# Patient Record
Sex: MALE | Race: WHITE
[De-identification: names, ages, dates, MRNs, and addresses within clinical notes are randomized per-mention and may not be internally consistent; named-entity substitution may affect disease eponyms.]

---

## 2018-01-02 ENCOUNTER — HOSPITAL ENCOUNTER (EMERGENCY)
Dept: HOSPITAL 76 - ED | Age: 33
Discharge: HOME | End: 2018-01-02
Payer: COMMERCIAL

## 2018-01-02 VITALS — DIASTOLIC BLOOD PRESSURE: 77 MMHG | SYSTOLIC BLOOD PRESSURE: 143 MMHG

## 2018-01-02 DIAGNOSIS — S61.214A: Primary | ICD-10-CM
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
## 2018-01-02 PROCEDURE — 99282 EMERGENCY DEPT VISIT SF MDM: CPT

## 2018-01-02 PROCEDURE — 99283 EMERGENCY DEPT VISIT LOW MDM: CPT

## 2018-01-02 PROCEDURE — 12001 RPR S/N/AX/GEN/TRNK 2.5CM/<: CPT

## 2018-07-16 ENCOUNTER — HOSPITAL ENCOUNTER (OUTPATIENT)
Age: 33
End: 2018-07-16
Payer: COMMERCIAL

## 2018-07-16 DIAGNOSIS — M25.561: Primary | ICD-10-CM

## 2018-07-16 DIAGNOSIS — M25.562: ICD-10-CM

## 2018-07-16 DIAGNOSIS — M25.461: ICD-10-CM

## 2018-07-16 DIAGNOSIS — M25.572: ICD-10-CM

## 2018-07-16 DIAGNOSIS — G89.29: ICD-10-CM

## 2018-07-16 DIAGNOSIS — M25.571: ICD-10-CM

## 2018-07-16 DIAGNOSIS — M25.462: ICD-10-CM

## 2018-07-16 PROCEDURE — 73610 X-RAY EXAM OF ANKLE: CPT

## 2018-07-16 PROCEDURE — 73562 X-RAY EXAM OF KNEE 3: CPT

## 2018-07-16 NOTE — DI.RAD.S_ITS
PROCEDURE:  XR KNEE LT 3V  
   
INDICATIONS:  Pain in bilateral knees and ankles  
   
TECHNIQUE: 3 views of the knee were acquired.    
   
COMPARISON:  [facility], CR, XR KNEE ARTHRITIC SERIES RT, 8/10/2017, 9:11.  
   
FINDINGS:    
   
Bones:  No fractures or dislocations.  No suspicious bony lesions.  Mild joint space   
narrowing in the lateral femorotibial compartment.  
   
Soft tissues: Small joint effusion.  No suspicious soft tissue calcifications.    
   
IMPRESSION:   
   
1. No fracture or dislocation.  
   
2. Mild joint space narrowing in the lateral femorotibial compartment.  
   
3. Small knee joint effusion.  
   
   
   
Dictated by: Radha Riley M.D. on 7/17/2018 at 16:57       
Approved by: Radha Riley M.D. on 7/17/2018 at 16:58

## 2018-07-16 NOTE — DI.RAD.S_ITS
PROCEDURE:  XR KNEE RT 3V  
   
INDICATIONS:  Pain in bilateral knees and ankles  
   
TECHNIQUE: 3 views of the knee were acquired.    
   
COMPARISON:  [facility], CR, XR KNEE ARTHRITIC SERIES RT, 8/10/2017, 9:11.    
[facility], MR, MR KNEE RT W CON, 8/31/2017, 16:02.  
   
FINDINGS:    
   
Bones:  No fractures or dislocations.  No suspicious bony lesions.    
   
Soft tissues: Small joint effusion.  No suspicious soft tissue calcifications.    
   
IMPRESSION:  
   
1. No fracture or dislocation.  
   
2. Small knee joint effusion.  
   
   
   
Dictated by: Radha Riley M.D. on 7/17/2018 at 17:00       
Approved by: Radah Riley M.D. on 7/17/2018 at 17:01

## 2018-07-16 NOTE — DI.RAD.S_ITS
PROCEDURE:  XR ANKLE RT MIN 3V  
   
INDICATIONS:  Pain in bilateral knees and ankles  
   
TECHNIQUE: 3 views of the ankle were acquired.    
   
COMPARISON:  None.  
   
FINDINGS:    
   
Bones:  No fractures or dislocations.  Ankle mortise is normally aligned.  No suspicious   
bony lesions.    
   
Soft tissues:  No tibiotalar joint effusion.  Achilles tendon appears normal.    
   
IMPRESSION: No fracture or dislocation.  
   
   
   
Dictated by: Radha Riley M.D. on 7/17/2018 at 16:58       
Approved by: Radha Riley M.D. on 7/17/2018 at 17:00

## 2018-07-16 NOTE — DI.RAD.S_ITS
PROCEDURE:  XR ANKLE LT MIN 3V  
   
INDICATIONS:  Pain in bilateral knees and ankles  
   
TECHNIQUE:  3 views of the ankle were acquired.    
   
COMPARISON:  None.  
   
FINDINGS:    
   
Bones:  No fractures or dislocations.  Ankle mortise is normally aligned.  No suspicious   
bony lesions.    
   
Soft tissues:  No tibiotalar joint effusion.  Achilles tendon appears normal.    
   
IMPRESSION: No fracture or dislocation.  
   
   
Dictated by: Radha Riley M.D. on 7/17/2018 at 16:56       
Approved by: Radha Riley M.D. on 7/17/2018 at 16:57

## 2020-02-03 ENCOUNTER — HOSPITAL ENCOUNTER (EMERGENCY)
Dept: HOSPITAL 76 - ED | Age: 35
Discharge: HOME | End: 2020-02-03
Payer: OTHER GOVERNMENT

## 2020-02-03 VITALS — DIASTOLIC BLOOD PRESSURE: 84 MMHG | SYSTOLIC BLOOD PRESSURE: 142 MMHG

## 2020-02-03 DIAGNOSIS — J06.9: Primary | ICD-10-CM

## 2020-02-03 DIAGNOSIS — R03.0: ICD-10-CM

## 2020-02-03 PROCEDURE — 99284 EMERGENCY DEPT VISIT MOD MDM: CPT

## 2020-02-03 PROCEDURE — 87070 CULTURE OTHR SPECIMN AEROBIC: CPT

## 2020-02-03 PROCEDURE — 87430 STREP A AG IA: CPT

## 2020-02-03 PROCEDURE — 71046 X-RAY EXAM CHEST 2 VIEWS: CPT

## 2020-09-22 ENCOUNTER — HOSPITAL ENCOUNTER (OUTPATIENT)
Age: 35
End: 2020-09-22
Payer: COMMERCIAL

## 2020-09-22 DIAGNOSIS — R10.32: Primary | ICD-10-CM

## 2020-09-22 PROCEDURE — 76705 ECHO EXAM OF ABDOMEN: CPT

## 2020-09-22 NOTE — DI.US.S_ITS
PROCEDURE:  US ABDOMEN LIMITED  
   
INDICATIONS:  LEFT LOWER QUADRANT PAIN - RULE OUT HERNIA  
   
TECHNIQUE:    
Real-time focused scanning was performed of the left lower quadrant/inguinal region, with   
image documentation.    
   
COMPARISON:  None.  
   
FINDINGS:  No evidence of abdominal wall hernia in the area of pain.  There is a   
subcentimeter intramuscular focus of hypoechogenicity, possibly cystic within the deep   
aspect of the left rectus muscle.  No increased intramuscular or surrounding vascular   
flow.  
   
IMPRESSION:    
1. No evidence of abdominal wall hernia.  
2. Tiny avascular intramuscular cyst of uncertain clinical significance or etiology.    
This may be incidental versus evidence of a minimal remote muscular injury.    
   
   
Dictated by: Chika Maya M.D. on 9/22/2020 at 8:55       
Approved by: Chika Maya M.D. on 9/22/2020 at 8:59

## 2020-10-13 ENCOUNTER — HOSPITAL ENCOUNTER (OUTPATIENT)
Dept: HOSPITAL 76 - COV | Age: 35
Discharge: HOME | End: 2020-10-13
Attending: FAMILY MEDICINE
Payer: OTHER GOVERNMENT

## 2020-10-13 DIAGNOSIS — Z20.828: ICD-10-CM

## 2020-10-13 DIAGNOSIS — R05: Primary | ICD-10-CM

## 2020-10-13 DIAGNOSIS — J02.9: ICD-10-CM

## 2022-02-15 ENCOUNTER — HOSPITAL ENCOUNTER (EMERGENCY)
Age: 37
LOS: 1 days | Discharge: HOME | End: 2022-02-16
Payer: OTHER GOVERNMENT

## 2022-02-15 VITALS
HEART RATE: 57 BPM | RESPIRATION RATE: 20 BRPM | OXYGEN SATURATION: 99 % | DIASTOLIC BLOOD PRESSURE: 73 MMHG | TEMPERATURE: 97.88 F | SYSTOLIC BLOOD PRESSURE: 150 MMHG

## 2022-02-15 DIAGNOSIS — R10.13: Primary | ICD-10-CM

## 2022-02-15 DIAGNOSIS — Z87.891: ICD-10-CM

## 2022-02-15 DIAGNOSIS — R59.0: ICD-10-CM

## 2022-02-15 LAB
ADD MANUAL DIFF / SLIDE REVIEW: NO
ALBUMIN SERPL-MCNC: 4.6 G/DL (ref 3.5–5)
ALBUMIN/GLOB SERPL: 1.4 {RATIO} (ref 1–2.8)
ALP SERPL-CCNC: 121 U/L (ref 38–126)
ALT SERPL-CCNC: 153 IU/L (ref ?–50)
BUN SERPL-MCNC: 14 MG/DL (ref 9–20)
CALCIUM SERPL-MCNC: 9.9 MG/DL (ref 8.4–10.2)
CHLORIDE SERPL-SCNC: 102 MMOL/L (ref 98–107)
CO2 SERPL-SCNC: 31 MMOL/L (ref 22–32)
ESTIMATED GLOMERULAR FILT RATE: > 60 ML/MIN (ref 60–?)
ETHANOL SERPL-MCNC: < 10 MG/DL
GLOBULIN SER CALC-MCNC: 3.3 G/DL (ref 1.7–4.1)
GLUCOSE SERPL-MCNC: 96 MG/DL (ref 70–100)
HEMATOCRIT: 44 % (ref 41–53)
HEMOGLOBIN: 15.3 G/DL (ref 13.5–17.5)
HEMOLYSIS: 46 (ref 0–50)
LIPASE SERPL-CCNC: 114 U/L (ref 23–300)
LYMPHOCYTES # SPEC AUTO: 2000 /UL (ref 1100–4500)
MCV RBC: 92.8 FL (ref 80–100)
MEAN CORPUSCULAR HEMOGLOBIN: 32.3 PG (ref 26–34)
MEAN CORPUSCULAR HGB CONC: 34.8 % (ref 30–36)
PLATELET COUNT: 207 X10^3/UL (ref 150–400)
POTASSIUM SERPL-SCNC: 4.2 MMOL/L (ref 3.4–5.1)
PROT SERPL-MCNC: 7.9 G/DL (ref 6.3–8.2)
SODIUM SERPL-SCNC: 139 MMOL/L (ref 137–145)

## 2022-02-15 PROCEDURE — 99283 EMERGENCY DEPT VISIT LOW MDM: CPT

## 2022-02-15 PROCEDURE — 99284 EMERGENCY DEPT VISIT MOD MDM: CPT

## 2022-02-15 PROCEDURE — C9113 INJ PANTOPRAZOLE SODIUM, VIA: HCPCS

## 2022-02-15 PROCEDURE — 96374 THER/PROPH/DIAG INJ IV PUSH: CPT

## 2022-02-15 PROCEDURE — 74177 CT ABD & PELVIS W/CONTRAST: CPT

## 2022-02-15 PROCEDURE — 85025 COMPLETE CBC W/AUTO DIFF WBC: CPT

## 2022-02-15 PROCEDURE — 96375 TX/PRO/DX INJ NEW DRUG ADDON: CPT

## 2022-02-15 PROCEDURE — 83690 ASSAY OF LIPASE: CPT

## 2022-02-15 PROCEDURE — 80053 COMPREHEN METABOLIC PANEL: CPT

## 2022-02-15 PROCEDURE — 76705 ECHO EXAM OF ABDOMEN: CPT

## 2022-02-15 PROCEDURE — 80320 DRUG SCREEN QUANTALCOHOLS: CPT

## 2022-02-16 VITALS
OXYGEN SATURATION: 99 % | DIASTOLIC BLOOD PRESSURE: 82 MMHG | HEART RATE: 73 BPM | RESPIRATION RATE: 16 BRPM | SYSTOLIC BLOOD PRESSURE: 155 MMHG

## 2024-02-12 ENCOUNTER — HOSPITAL ENCOUNTER (OUTPATIENT)
Dept: HOSPITAL 76 - DI.N | Age: 39
Discharge: HOME | End: 2024-02-12
Attending: PHYSICIAN ASSISTANT
Payer: OTHER GOVERNMENT

## 2024-02-12 DIAGNOSIS — M79.672: Primary | ICD-10-CM

## 2024-08-07 ENCOUNTER — HOSPITAL ENCOUNTER (OUTPATIENT)
Dept: HOSPITAL 76 - LAB.N | Age: 39
Discharge: HOME | End: 2024-08-07
Attending: FAMILY MEDICINE
Payer: OTHER GOVERNMENT

## 2024-08-07 DIAGNOSIS — M25.50: ICD-10-CM

## 2024-08-07 DIAGNOSIS — R74.8: Primary | ICD-10-CM

## 2024-08-07 LAB
ALBUMIN DIAFP-MCNC: 4.6 G/DL (ref 3.2–5.5)
ALP SERPL-CCNC: 80 IU/L (ref 42–121)
ALT SERPL W P-5'-P-CCNC: 117 IU/L (ref 10–60)
AST SERPL W P-5'-P-CCNC: 64 IU/L (ref 10–42)
BILIRUB BLD-MCNC: 1.2 MG/DL (ref 0.2–1)
BILIRUB DIRECT SERPL-MCNC: 0.17 MG/DL (ref 0.03–0.18)
CRP SERPL-MCNC: < 0.5 MG/DL (ref ?–0.5)
FERRITIN SERPL-SCNC: 2227.3 NG/ML (ref 23.9–336.2)
GLOBULIN SER-MCNC: 2.4 G/DL (ref 2.1–4.2)
PROT SPEC-MCNC: 7 G/DL (ref 6.4–8.9)
RHEUMATOID FACT SER QL: NEGATIVE

## 2024-08-07 PROCEDURE — 86140 C-REACTIVE PROTEIN: CPT

## 2024-08-07 PROCEDURE — 80076 HEPATIC FUNCTION PANEL: CPT

## 2024-08-07 PROCEDURE — 86200 CCP ANTIBODY: CPT

## 2024-08-07 PROCEDURE — 86709 HEPATITIS A IGM ANTIBODY: CPT

## 2024-08-07 PROCEDURE — 82728 ASSAY OF FERRITIN: CPT

## 2024-08-07 PROCEDURE — 86803 HEPATITIS C AB TEST: CPT

## 2024-08-07 PROCEDURE — 86038 ANTINUCLEAR ANTIBODIES: CPT

## 2024-08-07 PROCEDURE — 86430 RHEUMATOID FACTOR TEST QUAL: CPT

## 2024-08-07 PROCEDURE — 36415 COLL VENOUS BLD VENIPUNCTURE: CPT

## 2024-08-07 PROCEDURE — 87340 HEPATITIS B SURFACE AG IA: CPT

## 2024-08-08 ENCOUNTER — HOSPITAL ENCOUNTER (OUTPATIENT)
Dept: HOSPITAL 76 - LAB.N | Age: 39
Discharge: HOME | End: 2024-08-08
Attending: FAMILY MEDICINE
Payer: OTHER GOVERNMENT

## 2024-08-08 DIAGNOSIS — M25.50: Primary | ICD-10-CM

## 2024-08-08 PROCEDURE — 36415 COLL VENOUS BLD VENIPUNCTURE: CPT

## 2024-08-08 PROCEDURE — 85651 RBC SED RATE NONAUTOMATED: CPT

## 2025-04-12 ENCOUNTER — HOSPITAL ENCOUNTER (EMERGENCY)
Age: 40
Discharge: HOME | End: 2025-04-12
Payer: OTHER GOVERNMENT

## 2025-04-12 VITALS — BODY MASS INDEX: 30.9 KG/M2

## 2025-04-12 VITALS
HEART RATE: 66 BPM | DIASTOLIC BLOOD PRESSURE: 84 MMHG | TEMPERATURE: 97.3 F | OXYGEN SATURATION: 96 % | RESPIRATION RATE: 12 BRPM | SYSTOLIC BLOOD PRESSURE: 149 MMHG

## 2025-04-12 VITALS — OXYGEN SATURATION: 94 % | HEART RATE: 61 BPM

## 2025-04-12 VITALS — HEART RATE: 51 BPM | OXYGEN SATURATION: 95 % | RESPIRATION RATE: 18 BRPM

## 2025-04-12 VITALS — HEART RATE: 61 BPM | OXYGEN SATURATION: 96 %

## 2025-04-12 VITALS — OXYGEN SATURATION: 94 % | HEART RATE: 56 BPM

## 2025-04-12 VITALS — HEART RATE: 53 BPM | OXYGEN SATURATION: 95 %

## 2025-04-12 DIAGNOSIS — E83.110: Primary | ICD-10-CM

## 2025-04-12 PROCEDURE — 99281 EMR DPT VST MAYX REQ PHY/QHP: CPT

## 2025-07-21 ENCOUNTER — HOSPITAL ENCOUNTER (OUTPATIENT)
Age: 40
End: 2025-07-21
Payer: COMMERCIAL

## 2025-07-21 DIAGNOSIS — K76.9: ICD-10-CM

## 2025-07-21 DIAGNOSIS — E83.119: Primary | ICD-10-CM

## 2025-07-21 LAB
ADD MANUAL DIFF / SLIDE REVIEW: NO
ALBUMIN SERPL-MCNC: 4.5 G/DL (ref 3.5–5)
ALBUMIN/GLOB SERPL: 1.7 {RATIO} (ref 1–2.8)
ALP SERPL-CCNC: 84 U/L (ref 38–126)
ALT SERPL-CCNC: 57 IU/L (ref ?–50)
AMORPH SED URNS QL MICRO: (no result)
APPEARANCE UR: CLEAR
AST SERPL-CCNC: 44 IU/L (ref 17–59)
BACTERIA URINE: (no result)
BASOPHILS # BLD AUTO: 0 /UL (ref 0–100)
BASOPHILS NFR BLD AUTO: 0.7 % (ref 0–2)
BILIRUB SERPL-MCNC: 0.8 MG/DL (ref 0.2–1.3)
BILIRUBIN URINE UA: NEGATIVE
BUN SERPL-MCNC: 16 MG/DL (ref 9–20)
BUN/CREAT SERPL: 19.5 (ref 6–22)
CALCIUM SERPL-MCNC: 8.9 MG/DL (ref 8.4–10.2)
CASTS URNS QL MICRO: (no result)
CHLORIDE SERPL-SCNC: 105 MMOL/L (ref 98–107)
CO2 SERPL-SCNC: 26 MMOL/L (ref 22–32)
COLOR UR: YELLOW
CREAT SERPL-MCNC: 0.82 MG/DL (ref 0.66–1.25)
CRYSTALS URNS MICRO: (no result)
EOSINOPHIL # BLD AUTO: 100 /UL (ref 0–450)
EOSINOPHIL NFR BLD AUTO: 2.6 % (ref 2–4)
EPI CELLS URNS QL MICRO: (no result)
ESTIMATED GLOMERULAR FILT RATE: > 60 ML/MIN (ref 60–?)
FERRITIN SERPL-MCNC: 688 NG/ML (ref 18–464)
GLOBULIN SER CALC-MCNC: 2.6 G/DL (ref 1.7–4.1)
GLUCOSE SERPL-MCNC: 98 MG/DL (ref 70–99)
GLUCOSE URINE UA: NEGATIVE G/DL
HEMATOCRIT: 40.3 % (ref 41–53)
HEMOGLOBIN: 13.9 G/DL (ref 13.5–17.5)
HEMOLYSIS: < 15 (ref 0–50)
HGB UR QL: NEGATIVE
IRON SERPL-MCNC: 265 UG/DL (ref 49–181)
KETONES URINE UA: NEGATIVE
LEUKOCYTE ESTERASE UR QL: NEGATIVE
LYMPHOCYTES # SPEC AUTO: 1500 /UL (ref 1100–4500)
LYMPHOCYTES PERCENT AUTO: 25.3 % (ref 25–40)
MCH RBC QN AUTO: 34 PG (ref 26–34)
MCV RBC: 98.7 FL (ref 80–100)
MEAN CORPUSCULAR HGB CONC: 34.4 % (ref 30–36)
MONOCYTES # BLD AUTO: 500 /UL (ref 0–900)
MONOCYTES NFR BLD AUTO: 9.1 % (ref 3–14)
NEUTROPHILS # BLD AUTO: 3600 /UL (ref 1500–7000)
NEUTROPHILS NFR BLD AUTO: 62.3 % (ref 50–75)
NITRITE URINE UA: NEGATIVE
PERCENT IRON SATURATION: 93 % (ref 20–50)
PH UR: 5.5 [PH] (ref 4.5–8)
PLATELET COUNT: 210 X10^3/UL (ref 150–400)
POTASSIUM SERPL-SCNC: 4.6 MMOL/L (ref 3.4–5.1)
PROT SERPL-MCNC: 7.1 G/DL (ref 6.3–8.2)
PROTEIN URINE UA: NEGATIVE
RBC URINE: (no result)
RED BLOOD CELL COUNT: 4.08 X10^6/UL (ref 4.5–5.9)
RED CELL DISTRIBUTION WIDTH: 13.2 % (ref 11.6–14.8)
SODIUM SERPL-SCNC: 139 MMOL/L (ref 137–145)
SP GR UR: 1.02 (ref 1–1.03)
SPERM # UR AUTO: (no result) /UL
SQUAMOUS EPITHELIAL CELL URINE: (no result)
TIBC SERPL-MCNC: 286 UG/DL (ref 261–462)
URINE VOLUME: (no result)
UROBILINOGEN UR QL: 0.2 E.U./DL
WBC URINE: (no result)
WHITE BLOOD CELL COUNT: 5.8 X10^3/UL (ref 4.5–11)
YEAST URNS QL MICRO: (no result)

## 2025-07-21 PROCEDURE — 80053 COMPREHEN METABOLIC PANEL: CPT

## 2025-07-21 PROCEDURE — 36415 COLL VENOUS BLD VENIPUNCTURE: CPT

## 2025-07-21 PROCEDURE — 81001 URINALYSIS AUTO W/SCOPE: CPT

## 2025-07-21 PROCEDURE — 83550 IRON BINDING TEST: CPT

## 2025-07-21 PROCEDURE — 82728 ASSAY OF FERRITIN: CPT

## 2025-07-21 PROCEDURE — 85025 COMPLETE CBC W/AUTO DIFF WBC: CPT

## 2025-07-21 PROCEDURE — 83540 ASSAY OF IRON: CPT
